# Patient Record
Sex: MALE | Race: WHITE | NOT HISPANIC OR LATINO
[De-identification: names, ages, dates, MRNs, and addresses within clinical notes are randomized per-mention and may not be internally consistent; named-entity substitution may affect disease eponyms.]

---

## 2022-10-13 PROBLEM — Z00.00 ENCOUNTER FOR PREVENTIVE HEALTH EXAMINATION: Status: ACTIVE | Noted: 2022-10-13

## 2022-10-14 ENCOUNTER — APPOINTMENT (OUTPATIENT)
Dept: ORTHOPEDIC SURGERY | Facility: CLINIC | Age: 87
End: 2022-10-14

## 2022-10-14 VITALS
SYSTOLIC BLOOD PRESSURE: 113 MMHG | HEIGHT: 69 IN | BODY MASS INDEX: 25.92 KG/M2 | WEIGHT: 175 LBS | DIASTOLIC BLOOD PRESSURE: 72 MMHG | HEART RATE: 78 BPM

## 2022-10-14 DIAGNOSIS — M48.04 SPINAL STENOSIS, THORACIC REGION: ICD-10-CM

## 2022-10-14 DIAGNOSIS — Z86.79 PERSONAL HISTORY OF OTHER DISEASES OF THE CIRCULATORY SYSTEM: ICD-10-CM

## 2022-10-14 DIAGNOSIS — Z78.9 OTHER SPECIFIED HEALTH STATUS: ICD-10-CM

## 2022-10-14 DIAGNOSIS — S32.10XA UNSPECIFIED FRACTURE OF SACRUM, INITIAL ENCOUNTER FOR CLOSED FRACTURE: ICD-10-CM

## 2022-10-14 PROCEDURE — 99203 OFFICE O/P NEW LOW 30 MIN: CPT

## 2022-10-14 NOTE — HISTORY OF PRESENT ILLNESS
[de-identified] : Mr. AG MORENO  is a 89 year old male who presents with low back and sacrum pain after a fall 2 weeks ago when he hit his back on the corner of a piece of furniture.  Denies any LE radicular symptoms.  Normal bowel and bladder control.   Denies any recent fevers, chills, sweats, weight loss, or infection.  He uses a pillow for sitting and it helps.  He uses Tylenol for symptom control with minimal relief. \par \par The patients past medical history, past surgical history, medications, allergies, and social history were reviewed by me today with the patient and documented accordingly.  In addition, the patient's family history, which is noncontributory to their visit, was also reviewed.\par

## 2022-10-14 NOTE — PHYSICAL EXAM
[Antalgic] : antalgic [de-identified] : Examination of the lumbar spine reveals no midline tenderness palpation, step-offs, or skin lesions. Decreased range of motion with respect to flexion, extension, lateral bending, and rotation. No tenderness to palpation of the sciatic notch. No tenderness palpation of the bilateral greater trochanters. No pain with passive internal/external rotation of the hips. No instability of bilateral lower extremities.  Negative HALIE. Negative straight leg raise bilaterally. No bowstring. Negative femoral stretch. 5 out of 5 iliopsoas, hip abductors, hips adductors, quadriceps, hamstrings, gastrocsoleus, tibialis anterior, extensor hallucis longus, peroneals. Grossly intact sensation to light touch bilateral lower extremities. 1+ patellar and Achilles reflexes. Downgoing Babinski. No clonus. Intact proprioception. Palpable pulses. No skin lesion and no edema on the right and left lower extremities. [de-identified] : AP lateral lumbar and pelvic x-rays does display a minimally displaced sacral fracture.  He also has an age-indeterminate T12 fracture.

## 2022-12-21 ENCOUNTER — APPOINTMENT (OUTPATIENT)
Dept: SURGICAL ONCOLOGY | Facility: CLINIC | Age: 87
End: 2022-12-21

## 2022-12-21 VITALS
DIASTOLIC BLOOD PRESSURE: 82 MMHG | OXYGEN SATURATION: 95 % | HEIGHT: 69 IN | WEIGHT: 170 LBS | SYSTOLIC BLOOD PRESSURE: 136 MMHG | RESPIRATION RATE: 16 BRPM | BODY MASS INDEX: 25.18 KG/M2 | HEART RATE: 84 BPM

## 2022-12-21 PROCEDURE — 99205 OFFICE O/P NEW HI 60 MIN: CPT

## 2022-12-21 NOTE — HISTORY OF PRESENT ILLNESS
[de-identified] : Patient is a 89 y/o male who presents an initial consultation for lesion in the mid-forehead present for several months which was biopsied and revealed squamous cell carcinoma. He was referred by Dr. Merrill and Dr. Salazar. \par \par Dermatopathology (8/03/22):\par Mid Forehead\par -Invasive poorly differentiated squamous cell carcinoma, extending to the base and lateral tissue edges\par \par PMHx: HTN, BPH, He is currently on Xarelto for PE back in 2020, Prior basal cell carcinomas resected

## 2022-12-21 NOTE — ASSESSMENT
[FreeTextEntry1] : Mid frontal scalp squamous cell carcinoma \par I had a long discussion with the pt and his family regarding his diagnosis, prognosis and all management options\par Will schedule for wide excision of the mid frontal scalp SCC w/ plastic reconstruction and excisional biopsies/resection of bilateral cheek lesions at the same time\par Surgical procedure discussed in detail\par All questions answered\par Will need medical clearance regarding stopping Xarelto prior to surgery   \par

## 2022-12-21 NOTE — CONSULT LETTER
[Consult Letter:] : I had the pleasure of evaluating your patient, [unfilled]. [Please see my note below.] : Please see my note below. [Sincerely,] : Sincerely, [Dear  ___] : Dear  [unfilled], [FreeTextEntry3] : Chaim Richardson MD FACS\par  [DrParviz  ___] : Dr. HORTON [DrParviz ___] : Dr. HORTON

## 2022-12-21 NOTE — ADDENDUM
[FreeTextEntry1] : I, Katheryn Velasquez, acted solely as a scribe for Dr. Chaim Richardson on this date 12/21/2022.\par

## 2022-12-21 NOTE — PHYSICAL EXAM
[Normal] : supple, no neck mass and thyroid not enlarged [Normal Neck Lymph Nodes] : normal neck lymph nodes  [Normal Supraclavicular Lymph Nodes] : normal supraclavicular lymph nodes [Normal Groin Lymph Nodes] : normal groin lymph nodes [Normal Axillary Lymph Nodes] : normal axillary lymph nodes [Normal] : oriented to person, place and time, with appropriate affect [de-identified] : 3 cm fungating tumor mid frontal scalp at site of SCC.  1.5 cm ulcerating mass right lateral cheek. 1 cm nodule left temporal region possibly SCC as well. Multiple benign-appearing pigmented and crusty lesions head, neck, trunk, extremities.

## 2022-12-28 ENCOUNTER — OUTPATIENT (OUTPATIENT)
Dept: OUTPATIENT SERVICES | Facility: HOSPITAL | Age: 87
LOS: 1 days | End: 2022-12-28
Payer: MEDICARE

## 2022-12-28 ENCOUNTER — RESULT REVIEW (OUTPATIENT)
Age: 87
End: 2022-12-28

## 2022-12-28 VITALS
WEIGHT: 169.32 LBS | TEMPERATURE: 98 F | HEIGHT: 67.75 IN | RESPIRATION RATE: 16 BRPM | DIASTOLIC BLOOD PRESSURE: 64 MMHG | SYSTOLIC BLOOD PRESSURE: 110 MMHG | HEART RATE: 78 BPM | OXYGEN SATURATION: 98 %

## 2022-12-28 DIAGNOSIS — C44.329 SQUAMOUS CELL CARCINOMA OF SKIN OF OTHER PARTS OF FACE: ICD-10-CM

## 2022-12-28 DIAGNOSIS — R22.0 LOCALIZED SWELLING, MASS AND LUMP, HEAD: ICD-10-CM

## 2022-12-28 DIAGNOSIS — I26.99 OTHER PULMONARY EMBOLISM WITHOUT ACUTE COR PULMONALE: ICD-10-CM

## 2022-12-28 DIAGNOSIS — Z01.818 ENCOUNTER FOR OTHER PREPROCEDURAL EXAMINATION: ICD-10-CM

## 2022-12-28 DIAGNOSIS — Z90.49 ACQUIRED ABSENCE OF OTHER SPECIFIED PARTS OF DIGESTIVE TRACT: Chronic | ICD-10-CM

## 2022-12-28 DIAGNOSIS — I10 ESSENTIAL (PRIMARY) HYPERTENSION: ICD-10-CM

## 2022-12-28 PROCEDURE — G0463: CPT

## 2022-12-28 PROCEDURE — 88321 CONSLTJ&REPRT SLD PREP ELSWR: CPT

## 2022-12-28 NOTE — H&P PST ADULT - NSICDXPASTMEDICALHX_GEN_ALL_CORE_FT
I will SWITCH the dose or number of times a day I take the medications listed below when I get home from the hospital:  None
PAST MEDICAL HISTORY:  History of BPH     HTN (hypertension)     Pulmonary embolism     Squamous cell carcinoma of skin of other parts of face

## 2022-12-28 NOTE — H&P PST ADULT - NSANTHOSAYNRD_GEN_A_CORE
No. DONY screening performed.  STOP BANG Legend: 0-2 = LOW Risk; 3-4 = INTERMEDIATE Risk; 5-8 = HIGH Risk

## 2022-12-28 NOTE — H&P PST ADULT - HISTORY OF PRESENT ILLNESS
This is a 89 y/o male with PMHX of HTN, BPH and PE (2020 on Xarelto, negative hematology workup), who presents to PST with pre-operative diagnosis of squamous cell carcinoma.  Pt had lesion on forehead  for a few months, which was biopsied by dermatology.  Pathology returned with squamous cell carcinoma.  Otherwise patient feels well today and denies any acute symptoms.

## 2023-01-03 ENCOUNTER — APPOINTMENT (OUTPATIENT)
Dept: SURGICAL ONCOLOGY | Facility: HOSPITAL | Age: 88
End: 2023-01-03

## 2023-01-03 ENCOUNTER — INPATIENT (INPATIENT)
Facility: HOSPITAL | Age: 88
LOS: 1 days | Discharge: HOME | DRG: 310 | End: 2023-01-05
Attending: HOSPITALIST | Admitting: HOSPITALIST
Payer: MEDICARE

## 2023-01-03 VITALS
DIASTOLIC BLOOD PRESSURE: 88 MMHG | HEIGHT: 67.75 IN | OXYGEN SATURATION: 97 % | RESPIRATION RATE: 20 BRPM | TEMPERATURE: 99 F | SYSTOLIC BLOOD PRESSURE: 143 MMHG | HEART RATE: 123 BPM | WEIGHT: 179.9 LBS

## 2023-01-03 DIAGNOSIS — Z90.49 ACQUIRED ABSENCE OF OTHER SPECIFIED PARTS OF DIGESTIVE TRACT: Chronic | ICD-10-CM

## 2023-01-03 DIAGNOSIS — R00.2 PALPITATIONS: ICD-10-CM

## 2023-01-03 LAB
ALBUMIN SERPL ELPH-MCNC: 3.6 G/DL — SIGNIFICANT CHANGE UP (ref 3.3–5)
ALP SERPL-CCNC: 67 U/L — SIGNIFICANT CHANGE UP (ref 40–120)
ALT FLD-CCNC: 16 U/L — SIGNIFICANT CHANGE UP (ref 10–45)
ANION GAP SERPL CALC-SCNC: 7 MMOL/L — SIGNIFICANT CHANGE UP (ref 5–17)
APTT BLD: 27.6 SEC — SIGNIFICANT CHANGE UP (ref 27.5–35.5)
AST SERPL-CCNC: 16 U/L — SIGNIFICANT CHANGE UP (ref 10–40)
BASOPHILS # BLD AUTO: 0.04 K/UL — SIGNIFICANT CHANGE UP (ref 0–0.2)
BASOPHILS NFR BLD AUTO: 0.6 % — SIGNIFICANT CHANGE UP (ref 0–2)
BILIRUB SERPL-MCNC: 0.6 MG/DL — SIGNIFICANT CHANGE UP (ref 0.2–1.2)
BUN SERPL-MCNC: 31 MG/DL — HIGH (ref 7–23)
CALCIUM SERPL-MCNC: 9.2 MG/DL — SIGNIFICANT CHANGE UP (ref 8.4–10.5)
CHLORIDE SERPL-SCNC: 105 MMOL/L — SIGNIFICANT CHANGE UP (ref 96–108)
CO2 SERPL-SCNC: 27 MMOL/L — SIGNIFICANT CHANGE UP (ref 22–31)
CREAT SERPL-MCNC: 1.23 MG/DL — SIGNIFICANT CHANGE UP (ref 0.5–1.3)
EGFR: 55 ML/MIN/1.73M2 — LOW
EOSINOPHIL # BLD AUTO: 0.07 K/UL — SIGNIFICANT CHANGE UP (ref 0–0.5)
EOSINOPHIL NFR BLD AUTO: 1.1 % — SIGNIFICANT CHANGE UP (ref 0–6)
GLUCOSE SERPL-MCNC: 103 MG/DL — HIGH (ref 70–99)
HCT VFR BLD CALC: 39.7 % — SIGNIFICANT CHANGE UP (ref 39–50)
HGB BLD-MCNC: 12.9 G/DL — LOW (ref 13–17)
IMM GRANULOCYTES NFR BLD AUTO: 0.3 % — SIGNIFICANT CHANGE UP (ref 0–0.9)
INR BLD: 1.14 RATIO — SIGNIFICANT CHANGE UP (ref 0.88–1.16)
LYMPHOCYTES # BLD AUTO: 1.1 K/UL — SIGNIFICANT CHANGE UP (ref 1–3.3)
LYMPHOCYTES # BLD AUTO: 16.8 % — SIGNIFICANT CHANGE UP (ref 13–44)
MAGNESIUM SERPL-MCNC: 2.2 MG/DL — SIGNIFICANT CHANGE UP (ref 1.6–2.6)
MCHC RBC-ENTMCNC: 28.4 PG — SIGNIFICANT CHANGE UP (ref 27–34)
MCHC RBC-ENTMCNC: 32.5 GM/DL — SIGNIFICANT CHANGE UP (ref 32–36)
MCV RBC AUTO: 87.3 FL — SIGNIFICANT CHANGE UP (ref 80–100)
MONOCYTES # BLD AUTO: 0.63 K/UL — SIGNIFICANT CHANGE UP (ref 0–0.9)
MONOCYTES NFR BLD AUTO: 9.6 % — SIGNIFICANT CHANGE UP (ref 2–14)
NEUTROPHILS # BLD AUTO: 4.68 K/UL — SIGNIFICANT CHANGE UP (ref 1.8–7.4)
NEUTROPHILS NFR BLD AUTO: 71.6 % — SIGNIFICANT CHANGE UP (ref 43–77)
NRBC # BLD: 0 /100 WBCS — SIGNIFICANT CHANGE UP (ref 0–0)
PLATELET # BLD AUTO: 205 K/UL — SIGNIFICANT CHANGE UP (ref 150–400)
POTASSIUM SERPL-MCNC: 4.5 MMOL/L — SIGNIFICANT CHANGE UP (ref 3.5–5.3)
POTASSIUM SERPL-SCNC: 4.5 MMOL/L — SIGNIFICANT CHANGE UP (ref 3.5–5.3)
PROT SERPL-MCNC: 7 G/DL — SIGNIFICANT CHANGE UP (ref 6–8.3)
PROTHROM AB SERPL-ACNC: 13.3 SEC — SIGNIFICANT CHANGE UP (ref 10.5–13.4)
RBC # BLD: 4.55 M/UL — SIGNIFICANT CHANGE UP (ref 4.2–5.8)
RBC # FLD: 13.2 % — SIGNIFICANT CHANGE UP (ref 10.3–14.5)
SARS-COV-2 RNA SPEC QL NAA+PROBE: SIGNIFICANT CHANGE UP
SODIUM SERPL-SCNC: 139 MMOL/L — SIGNIFICANT CHANGE UP (ref 135–145)
TROPONIN I, HIGH SENSITIVITY RESULT: 14.7 NG/L — SIGNIFICANT CHANGE UP
TROPONIN I, HIGH SENSITIVITY RESULT: 15.1 NG/L — SIGNIFICANT CHANGE UP
TROPONIN I, HIGH SENSITIVITY RESULT: 15.7 NG/L — SIGNIFICANT CHANGE UP
WBC # BLD: 6.54 K/UL — SIGNIFICANT CHANGE UP (ref 3.8–10.5)
WBC # FLD AUTO: 6.54 K/UL — SIGNIFICANT CHANGE UP (ref 3.8–10.5)

## 2023-01-03 PROCEDURE — 93010 ELECTROCARDIOGRAM REPORT: CPT | Mod: 76

## 2023-01-03 PROCEDURE — 99222 1ST HOSP IP/OBS MODERATE 55: CPT

## 2023-01-03 PROCEDURE — 99223 1ST HOSP IP/OBS HIGH 75: CPT | Mod: AI

## 2023-01-03 PROCEDURE — 71045 X-RAY EXAM CHEST 1 VIEW: CPT | Mod: 26

## 2023-01-03 PROCEDURE — 99285 EMERGENCY DEPT VISIT HI MDM: CPT | Mod: FS

## 2023-01-03 RX ORDER — ONDANSETRON 8 MG/1
4 TABLET, FILM COATED ORAL EVERY 8 HOURS
Refills: 0 | Status: DISCONTINUED | OUTPATIENT
Start: 2023-01-03 | End: 2023-01-05

## 2023-01-03 RX ORDER — HEPARIN SODIUM 5000 [USP'U]/ML
6500 INJECTION INTRAVENOUS; SUBCUTANEOUS ONCE
Refills: 0 | Status: COMPLETED | OUTPATIENT
Start: 2023-01-03 | End: 2023-01-03

## 2023-01-03 RX ORDER — TAMSULOSIN HYDROCHLORIDE 0.4 MG/1
0.4 CAPSULE ORAL AT BEDTIME
Refills: 0 | Status: DISCONTINUED | OUTPATIENT
Start: 2023-01-03 | End: 2023-01-05

## 2023-01-03 RX ORDER — ACETAMINOPHEN 500 MG
650 TABLET ORAL EVERY 6 HOURS
Refills: 0 | Status: DISCONTINUED | OUTPATIENT
Start: 2023-01-03 | End: 2023-01-05

## 2023-01-03 RX ORDER — HEPARIN SODIUM 5000 [USP'U]/ML
6500 INJECTION INTRAVENOUS; SUBCUTANEOUS EVERY 6 HOURS
Refills: 0 | Status: DISCONTINUED | OUTPATIENT
Start: 2023-01-03 | End: 2023-01-05

## 2023-01-03 RX ORDER — VALSARTAN 80 MG/1
160 TABLET ORAL DAILY
Refills: 0 | Status: DISCONTINUED | OUTPATIENT
Start: 2023-01-03 | End: 2023-01-03

## 2023-01-03 RX ORDER — HEPARIN SODIUM 5000 [USP'U]/ML
INJECTION INTRAVENOUS; SUBCUTANEOUS
Qty: 25000 | Refills: 0 | Status: DISCONTINUED | OUTPATIENT
Start: 2023-01-03 | End: 2023-01-05

## 2023-01-03 RX ORDER — HEPARIN SODIUM 5000 [USP'U]/ML
3000 INJECTION INTRAVENOUS; SUBCUTANEOUS EVERY 6 HOURS
Refills: 0 | Status: DISCONTINUED | OUTPATIENT
Start: 2023-01-03 | End: 2023-01-05

## 2023-01-03 RX ORDER — METOPROLOL TARTRATE 50 MG
25 TABLET ORAL
Refills: 0 | Status: DISCONTINUED | OUTPATIENT
Start: 2023-01-03 | End: 2023-01-03

## 2023-01-03 RX ORDER — VALSARTAN 80 MG/1
1 TABLET ORAL
Qty: 0 | Refills: 0 | DISCHARGE

## 2023-01-03 RX ORDER — SODIUM CHLORIDE 9 MG/ML
1000 INJECTION INTRAMUSCULAR; INTRAVENOUS; SUBCUTANEOUS
Refills: 0 | Status: DISCONTINUED | OUTPATIENT
Start: 2023-01-03 | End: 2023-01-05

## 2023-01-03 RX ORDER — LANOLIN ALCOHOL/MO/W.PET/CERES
3 CREAM (GRAM) TOPICAL AT BEDTIME
Refills: 0 | Status: DISCONTINUED | OUTPATIENT
Start: 2023-01-03 | End: 2023-01-05

## 2023-01-03 RX ORDER — METOPROLOL TARTRATE 50 MG
25 TABLET ORAL ONCE
Refills: 0 | Status: COMPLETED | OUTPATIENT
Start: 2023-01-03 | End: 2023-01-03

## 2023-01-03 RX ORDER — METOPROLOL TARTRATE 50 MG
50 TABLET ORAL
Refills: 0 | Status: DISCONTINUED | OUTPATIENT
Start: 2023-01-04 | End: 2023-01-05

## 2023-01-03 RX ORDER — METOPROLOL TARTRATE 50 MG
12.5 TABLET ORAL ONCE
Refills: 0 | Status: COMPLETED | OUTPATIENT
Start: 2023-01-03 | End: 2023-01-03

## 2023-01-03 RX ORDER — AMLODIPINE BESYLATE 2.5 MG/1
5 TABLET ORAL DAILY
Refills: 0 | Status: DISCONTINUED | OUTPATIENT
Start: 2023-01-03 | End: 2023-01-05

## 2023-01-03 RX ADMIN — TAMSULOSIN HYDROCHLORIDE 0.4 MILLIGRAM(S): 0.4 CAPSULE ORAL at 21:56

## 2023-01-03 RX ADMIN — HEPARIN SODIUM 5000 UNIT(S): 5000 INJECTION INTRAVENOUS; SUBCUTANEOUS at 18:56

## 2023-01-03 RX ADMIN — Medication 25 MILLIGRAM(S): at 19:22

## 2023-01-03 RX ADMIN — SODIUM CHLORIDE 100 MILLILITER(S): 9 INJECTION INTRAMUSCULAR; INTRAVENOUS; SUBCUTANEOUS at 22:19

## 2023-01-03 RX ADMIN — HEPARIN SODIUM 1500 UNIT(S)/HR: 5000 INJECTION INTRAVENOUS; SUBCUTANEOUS at 18:57

## 2023-01-03 RX ADMIN — Medication 12.5 MILLIGRAM(S): at 10:47

## 2023-01-03 RX ADMIN — HEPARIN SODIUM 1500 UNIT(S)/HR: 5000 INJECTION INTRAVENOUS; SUBCUTANEOUS at 20:01

## 2023-01-03 RX ADMIN — Medication 25 MILLIGRAM(S): at 18:28

## 2023-01-03 NOTE — ED ADULT NURSE NOTE - HISTORY OF COVID-19 VACCINATION
Fever beginning yesterday, Dad took pt to the ER, dad states he was advised to give Ibuprofen or Tylenol for fever.    Discussed possibility of increased Covid sx if pt has Covid and is taking Ibuprofen.  Afebrile this morning.   Da to give Tylenol for fever if fever recurs, to call if other sx develop.   
Kamlesh (patient's dad) states patient was seen in the ER yesterday for a fever.  They recommended ibuprofen and tylenol.  Kamlesh states PCP recommended patient to stay away from ibuprofen.  Should they still keep patient off ibuprofen?  Should patient come in to be seen? Please advise.    Kamlesh can be reached at [phone].  
Yes

## 2023-01-03 NOTE — ED ADULT NURSE NOTE - OBJECTIVE STATEMENT
89 yr old male to ED for complaints of palpitations. Patient BIB stretcher from pre- op for complaints of tachycardia and palpitations. Patient awake, alert, denies chest pain, shortness of breath.

## 2023-01-03 NOTE — H&P ADULT - NSHPSOCIALHISTORY_GEN_ALL_CORE
Prior cigar user, never cigarettes    Family hx significant for cancer; one sibling  in 70s of valvular heart disease

## 2023-01-03 NOTE — H&P ADULT - ASSESSMENT
89M with HTN, BPH, hx PE on Xarelto, comes to the ED for rapid heart rate while in pre-op awaiting for elective removal of SCC of skin with plastic reconstruction, found to have new AF with RVR    #AF with RVR  -Start lopressor 25 bid  -Already on Xarelto for PE.... but since patient is pre-op at this time, will start heparin drip (to cover AF as well as PE)  -Check echo  -Check TSH  -EKG in AM  -tele monitor  -Appreciate cardio recs: Dr. Miranda    #BPH  -c/w Flomax    #Essential HTN  -c/w Norvasc, Valsartan  -added Lopressor as above... may need to cut down on the Norvasc or Valsartan with the added beta blocker... will monitor BP    #DVT ppx: Heparin drip for now 89M with HTN, BPH, hx PE on Xarelto, comes to the ED for rapid heart rate while in pre-op awaiting for elective removal of SCC of skin with plastic reconstruction, found to have new AF with RVR    #AF with RVR  -Start lopressor 50 bid as patient still with fast HR on 25 mg   -Already on Xarelto for PE.... but since patient is pre-op at this time, will start heparin drip (to cover AF as well as PE)  -Check echo  -Check TSH  -EKG in AM  -tele monitor  -Appreciate cardio recs: Dr. Miranda  -Spoke to Dr. Richardson, surgery, who is hoping to do the procedure on Thursday... will attempt to optimize     #BPH  -c/w Flomax    #Essential HTN  -c/w Norvasc  -d/c Valsartan while adding Lopressor   -added Lopressor as above    #DVT ppx: Heparin drip for now    GOC: FULL CODE 89M with HTN, BPH, hx PE on Xarelto, comes to the ED for rapid heart rate while in pre-op awaiting for elective removal of SCC of skin with plastic reconstruction, found to have new AF with RVR    #AF with RVR  -Start lopressor 50 bid as patient still with fast HR on 25 mg   -Already on Xarelto for PE.... but since patient is pre-op at this time, will start heparin drip (to cover AF as well as PE)  -Check echo  -Check TSH  -EKG in AM  -500 cc IVF as patient dehydration and this could be precipitating the fast HR  -tele monitor  -Appreciate cardio recs: Dr. Miranda  -Spoke to Dr. Richardson, surgery, who is hoping to do the procedure on Thursday... will attempt to optimize     #BPH  -c/w Flomax    #Essential HTN  -c/w Norvasc  -d/c Valsartan while adding Lopressor   -added Lopressor as above    #DVT ppx: Heparin drip for now    GOC: FULL CODE

## 2023-01-03 NOTE — ED ADULT NURSE REASSESSMENT NOTE - NS ED NURSE REASSESS COMMENT FT1
Patient denies pain/complaints. Awaiting bed, attempted to give report, nurse Gilda to call back. Spoke to Debra. Continuous monitoring. Safety maintained.

## 2023-01-03 NOTE — CONSULT NOTE ADULT - SUBJECTIVE AND OBJECTIVE BOX
AG MORENO  [id number]      HPI:    Ag Moreno is a 89 year old man with past medical history of Pulmonary embolism (2020, on Xarelto), Hypertension and BPH who presented today for elective wide excision of frontal scalp squamous cell carcinoma with plastic reconstruction.     ALLERGIES:  No Known Allergies      PAST MEDICAL & SURGICAL HISTORY:  Squamous cell carcinoma of skin of other parts of face  History of BPH  HTN (hypertension)  Pulmonary embolism  S/P colon resection        ROS:  All 10 systems reviewed and positives noted in HPI    OBJECTIVE:    VITAL SIGNS:  Vital Signs Last 24 Hrs  T(C): 37 (03 Jan 2023 09:43), Max: 37 (03 Jan 2023 09:43)  T(F): 98.6 (03 Jan 2023 09:43), Max: 98.6 (03 Jan 2023 09:43)  HR: 124 (03 Jan 2023 11:54) (122 - 129)  BP: 122/105 (03 Jan 2023 11:54) (122/105 - 150/77)  BP(mean): --  RR: 23 (03 Jan 2023 10:45) (16 - 23)  SpO2: 97% (03 Jan 2023 11:54) (91% - 97%)    Parameters below as of 03 Jan 2023 09:43  Patient On (Oxygen Delivery Method): room air        PHYSICAL EXAM:  General: well appearing, no distress  HEENT: sclera anicteric  Neck: supple, no carotid bruits b/l  CVS: JVP ~ 7 cm H20, RRR, s1, s2, no murmurs/rubs/gallops  Chest: unlabored respirations, clear to auscultation b/l  Abdomen: non-distended  Extremities: no lower extremity edema b/l  Neuro: awake, alert & oriented x 3  Psych: normal affect      LABS:                        12.9   6.54  )-----------( 205      ( 03 Jan 2023 10:25 )             39.7     01-03    139  |  105  |  31<H>  ----------------------------<  103<H>  4.5   |  27  |  1.23    Ca    9.2      03 Jan 2023 10:25  Mg     2.2     01-03    TPro  7.0  /  Alb  3.6  /  TBili  0.6  /  DBili  x   /  AST  16  /  ALT  16  /  AlkPhos  67  01-03          No prior cardiac workup in chart   AG MORENO  [id number]      HPI:    Ag Moreno is a 89 year old man with past medical history of Pulmonary embolism (2020, on Xarelto), Hypertension and BPH who presented today for elective wide excision of frontal scalp squamous cell carcinoma with plastic reconstruction, sent to ER due to tachycardia.    The patient denies prior cardiac history. Denies chest pain, palpitations or shortness of breath. Denies prior history of atrial fibrillation.     ALLERGIES:  No Known Allergies      PAST MEDICAL & SURGICAL HISTORY:  Squamous cell carcinoma of skin of other parts of face  History of BPH  HTN (hypertension)  Pulmonary embolism  S/P colon resection        ROS:  All 10 systems reviewed and positives noted in HPI    OBJECTIVE:    VITAL SIGNS:  Vital Signs Last 24 Hrs  T(C): 37 (03 Jan 2023 09:43), Max: 37 (03 Jan 2023 09:43)  T(F): 98.6 (03 Jan 2023 09:43), Max: 98.6 (03 Jan 2023 09:43)  HR: 124 (03 Jan 2023 11:54) (122 - 129)  BP: 122/105 (03 Jan 2023 11:54) (122/105 - 150/77)  BP(mean): --  RR: 23 (03 Jan 2023 10:45) (16 - 23)  SpO2: 97% (03 Jan 2023 11:54) (91% - 97%)    Parameters below as of 03 Jan 2023 09:43  Patient On (Oxygen Delivery Method): room air        PHYSICAL EXAM:  General: no acute distress  HEENT: sclera anicteric  Neck: supple, no carotid bruits b/l  CVS: JVP ~ 7 cm H20, irregular, tachycardic, s1, s2  Chest: unlabored respirations, clear to auscultation b/l  Abdomen: non-distended  Extremities: no lower extremity edema b/l  Neuro: awake, alert & oriented x 3  Psych: normal affect      LABS:                        12.9   6.54  )-----------( 205      ( 03 Jan 2023 10:25 )             39.7     01-03    139  |  105  |  31<H>  ----------------------------<  103<H>  4.5   |  27  |  1.23    Ca    9.2      03 Jan 2023 10:25  Mg     2.2     01-03    TPro  7.0  /  Alb  3.6  /  TBili  0.6  /  DBili  x   /  AST  16  /  ALT  16  /  AlkPhos  67  01-03    Initial ECG: poor baseline, possible sinus rhythm   Repeat ECG (1/3/23): atrial flutter with rapid ventricular response    No prior cardiac workup in chart

## 2023-01-03 NOTE — ED ADULT NURSE REASSESSMENT NOTE - NS ED NURSE REASSESS COMMENT FT1
Patient's heart rate 126-133, denies chest pain/complaints. Dr. Hensley aware of HR. Patient medicated with metoprolol 25 mg po. Continuous monitoring. Safety maintained.

## 2023-01-03 NOTE — CONSULT NOTE ADULT - ASSESSMENT
Assessment:  Rick Chan is a 89 year old man with past medical history of Pulmonary embolism (2020, on Xarelto), Hypertension and BPH who presented today for elective wide excision of frontal scalp squamous cell carcinoma with plastic reconstruction.  Assessment:  Rick Chan is a 89 year old man with past medical history of Pulmonary embolism (2020, on Xarelto), Hypertension and BPH who presented today for elective wide excision of frontal scalp squamous cell carcinoma with plastic reconstruction, sent to ER due to tachycardia, found to have new onset atrial flutter.    ECG consistent with atrial flutter with rapid ventricular rates. Troponins negative x 3. The patient is asymptomatic, denies palpitations, angina or dyspnea. No signs of CHF.     Recommendations:  [] New onset atrial flutter: Due to elevated CHADSVASc score, anticoagulation for stroke prophylaxis would be recommended however patient awaiting re-scheduling of scalp surgery this week, will need to resume home Xarelto once safe per Surgical team (patient takes this for PE history). Consider Hematology evaluation while Xarelto being held. Dose Metoprolol tartrate 25 mg PO BID. Continue to monitor on telemetry. Check echocardiogram to evaluate for structural heart disease. Check TSH.    Recommend inpatient admission, discussed with ER team.    We will continue to follow along.    Derek Miranda MD  Cardiology

## 2023-01-03 NOTE — ED ADULT NURSE NOTE - NSICDXPASTMEDICALHX_GEN_ALL_CORE_FT
PAST MEDICAL HISTORY:  History of BPH     HTN (hypertension)     Pulmonary embolism     Squamous cell carcinoma of skin of other parts of face

## 2023-01-03 NOTE — ED PROVIDER NOTE - CLINICAL SUMMARY MEDICAL DECISION MAKING FREE TEXT BOX
89 yo male, hx bph , htn comes to the ED for rapid heart rate. As per patient was upstairs in the hospital getting ready to have a squamous cell removed and his heart rate was elevated on the monitor to the 130s so they brought him down to the ED. As per patient he feels fine.  Denies any c pain, headaches, dizziness, shortness of breath, lightheadedness or any other complaints. 89 yo male, hx bph , htn comes to the ED for rapid heart rate. As per patient was upstairs in the hospital getting ready to have a squamous cell removed and his heart rate was elevated on the monitor to the 130s so they brought him down to the ED. As per patient he feels fine.  Denies any c pain, headaches, dizziness, shortness of breath, lightheadedness or any other complaints.    Seen by Dr Miranda cardiology,  pt new onset a fib. can put on metoprolol tartrate 25 BID. At this time. To  admit for work up and will have his Squamous cell removed Thursday likely so no anticoagulation at this time as per cardiology.

## 2023-01-03 NOTE — H&P ADULT - NSHPPHYSICALEXAM_GEN_ALL_CORE
Vital Signs Last 24 Hrs  T(F): 98.6 (03 Jan 2023 09:43), Max: 98.6 (03 Jan 2023 09:43)  HR: 126 (03 Jan 2023 18:00) (122 - 129)  BP: 134/86 (03 Jan 2023 18:00) (122/105 - 150/77)  RR: 19 (03 Jan 2023 18:00) (16 - 23)  SpO2: 98% (03 Jan 2023 18:00) (91% - 98%)    PHYSICAL EXAM:  GENERAL: NAD  HEAD:  Atraumatic, Normocephalic  EYES: EOMI, conjunctiva and sclera clear  ENMT: No gross hearing impairment, dry mucous membranes, Good dentition, no thrush  NECK: Supple, No JVD  CHEST/LUNG: Clear to auscultation bilaterally, good air entry, non-labored breathing  HEART: IRRR; tachycardic; S1/S2, No murmur  ABDOMEN: Soft, Nontender, Nondistended; Bowel sounds present  VASCULAR: Normal pulses, Normal capillary refill  EXTREMITIES: No calf tenderness, No cyanosis, 1+ pitting edema  LYMPH: Normal; No lymphadenopathy noted  SKIN: Warm, Intact, + tenting; forehead rounded lesion malodorous with scant discharge  PSYCH: Normal mood, Normal affect  NERVOUS SYSTEM:  A/O x3, Good concentration; CN 2-12 intact, No focal deficits

## 2023-01-03 NOTE — ED PROVIDER NOTE - OBJECTIVE STATEMENT
89 yo male, hx bph , htn comes to the ED for rapid heart rate. As per patient was upstairs in the hospital getting ready to have a squamous cell removed and his heart rate was elevated on the monitor to the 130s so they brought him down to the ED. As per patient he feels fine.  Denies any c pain, headaches, dizziness, shortness of breath, lightheadedness or any other complaints.

## 2023-01-03 NOTE — PATIENT PROFILE ADULT - FALL HARM RISK - HARM RISK INTERVENTIONS

## 2023-01-03 NOTE — PATIENT PROFILE ADULT - STATED REASON FOR ADMISSION
"I have a squamous cell lesion that had to be removed,  but my heart beat was too high so they didn't do the surgery"

## 2023-01-03 NOTE — ED PROVIDER NOTE - ATTENDING APP SHARED VISIT CONTRIBUTION OF CARE
Pt in NAD;  HEENT: NCAT; AAO x 3; lungs CTAB; heart: tachycardic/irregular; Abdomen SNT; non-focal neuro exam    Angelo Patricio DO

## 2023-01-03 NOTE — H&P ADULT - HISTORY OF PRESENT ILLNESS
89M with HTN, BPH, hx PE on Xarelto, comes to the ED for rapid heart rate while in pre-op awaiting for elective removal of SCC from forehead with plan for plastic reconstruction.  89M with HTN, BPH, hx PE on Xarelto, comes to the ED for rapid heart rate while in pre-op awaiting for elective removal of SCC from forehead with plan for plastic reconstruction. Patient is ASYMPTOMATIC. No chest pain. No shortness of breath. No palpitations. No SHEFFIELD. No hx of A-fib. Unaware that patient is currently have a rapid HR. Does have chronic HTN (stable, controlled with meds), BPH (stable, controlled with med, voids freely), and known history of PE (stable, no SOB, on Xarelto). Compliant with all meds. Patient does have forehead lesion (SCC) and other facial skin lesions. Last ate/drank last night.

## 2023-01-03 NOTE — H&P ADULT - NSHPLABSRESULTS_GEN_ALL_CORE
.                            12.9   6.54  )-----------( 205      ( 03 Jan 2023 10:25 )             39.7       01-03    139  |  105  |  31  ----------------------------<  103  4.5   |  27  |  1.23    Ca    9.2      03 Jan 2023 10:25  Mg     2.2     01-03    TPro  7.0  /  Alb  3.6  /  TBili  0.6  /  DBili  x   /  AST  16  /  ALT  16  /  AlkPhos  67  01-03    PT/INR - ( 03 Jan 2023 18:25 )   PT: 13.3 sec;   INR: 1.14 ratio         PTT - ( 03 Jan 2023 18:25 )  PTT:27.6 sec  CARDIAC MARKERS ( 03 Jan 2023 12:50 )  x     / 15.7 ng/L / x     / x     / x      CARDIAC MARKERS ( 03 Jan 2023 11:20 )  x     / 14.7 ng/L / x     / x     / x      CARDIAC MARKERS ( 03 Jan 2023 10:25 )  x     / 15.1 ng/L / x     / x     / x        COVID-19 PCR: NotDetec (01-03-23 @ 15:05)    EKG: AF with RVR    Case d/w Dr. Richardson - oncologic surgeon, re: surgical planning

## 2023-01-04 PROBLEM — Z87.438 PERSONAL HISTORY OF OTHER DISEASES OF MALE GENITAL ORGANS: Chronic | Status: ACTIVE | Noted: 2022-12-28

## 2023-01-04 PROBLEM — I10 ESSENTIAL (PRIMARY) HYPERTENSION: Chronic | Status: ACTIVE | Noted: 2022-12-28

## 2023-01-04 PROBLEM — I26.99 OTHER PULMONARY EMBOLISM WITHOUT ACUTE COR PULMONALE: Chronic | Status: ACTIVE | Noted: 2022-12-28

## 2023-01-04 PROBLEM — C44.329 SQUAMOUS CELL CARCINOMA OF SKIN OF OTHER PARTS OF FACE: Chronic | Status: ACTIVE | Noted: 2022-12-28

## 2023-01-04 LAB
ANION GAP SERPL CALC-SCNC: 5 MMOL/L — SIGNIFICANT CHANGE UP (ref 5–17)
APTT BLD: 111 SEC — HIGH (ref 27.5–35.5)
APTT BLD: 170.3 SEC — SIGNIFICANT CHANGE UP (ref 27.5–35.5)
APTT BLD: 70.7 SEC — HIGH (ref 27.5–35.5)
APTT BLD: 97 SEC — HIGH (ref 27.5–35.5)
BUN SERPL-MCNC: 27 MG/DL — HIGH (ref 7–23)
CALCIUM SERPL-MCNC: 8.4 MG/DL — SIGNIFICANT CHANGE UP (ref 8.4–10.5)
CHLORIDE SERPL-SCNC: 104 MMOL/L — SIGNIFICANT CHANGE UP (ref 96–108)
CO2 SERPL-SCNC: 27 MMOL/L — SIGNIFICANT CHANGE UP (ref 22–31)
CREAT SERPL-MCNC: 1.12 MG/DL — SIGNIFICANT CHANGE UP (ref 0.5–1.3)
EGFR: 62 ML/MIN/1.73M2 — SIGNIFICANT CHANGE UP
GLUCOSE SERPL-MCNC: 95 MG/DL — SIGNIFICANT CHANGE UP (ref 70–99)
HCT VFR BLD CALC: 36.7 % — LOW (ref 39–50)
HCT VFR BLD CALC: 37.7 % — LOW (ref 39–50)
HGB BLD-MCNC: 11.8 G/DL — LOW (ref 13–17)
HGB BLD-MCNC: 12 G/DL — LOW (ref 13–17)
MAGNESIUM SERPL-MCNC: 2.2 MG/DL — SIGNIFICANT CHANGE UP (ref 1.6–2.6)
MCHC RBC-ENTMCNC: 28 PG — SIGNIFICANT CHANGE UP (ref 27–34)
MCHC RBC-ENTMCNC: 28 PG — SIGNIFICANT CHANGE UP (ref 27–34)
MCHC RBC-ENTMCNC: 31.8 GM/DL — LOW (ref 32–36)
MCHC RBC-ENTMCNC: 32.2 GM/DL — SIGNIFICANT CHANGE UP (ref 32–36)
MCV RBC AUTO: 87 FL — SIGNIFICANT CHANGE UP (ref 80–100)
MCV RBC AUTO: 87.9 FL — SIGNIFICANT CHANGE UP (ref 80–100)
NRBC # BLD: 0 /100 WBCS — SIGNIFICANT CHANGE UP (ref 0–0)
NRBC # BLD: 0 /100 WBCS — SIGNIFICANT CHANGE UP (ref 0–0)
PHOSPHATE SERPL-MCNC: 2.7 MG/DL — SIGNIFICANT CHANGE UP (ref 2.5–4.5)
PLATELET # BLD AUTO: 199 K/UL — SIGNIFICANT CHANGE UP (ref 150–400)
PLATELET # BLD AUTO: 203 K/UL — SIGNIFICANT CHANGE UP (ref 150–400)
POTASSIUM SERPL-MCNC: 4.4 MMOL/L — SIGNIFICANT CHANGE UP (ref 3.5–5.3)
POTASSIUM SERPL-SCNC: 4.4 MMOL/L — SIGNIFICANT CHANGE UP (ref 3.5–5.3)
RBC # BLD: 4.22 M/UL — SIGNIFICANT CHANGE UP (ref 4.2–5.8)
RBC # BLD: 4.29 M/UL — SIGNIFICANT CHANGE UP (ref 4.2–5.8)
RBC # FLD: 13.2 % — SIGNIFICANT CHANGE UP (ref 10.3–14.5)
RBC # FLD: 13.2 % — SIGNIFICANT CHANGE UP (ref 10.3–14.5)
SODIUM SERPL-SCNC: 136 MMOL/L — SIGNIFICANT CHANGE UP (ref 135–145)
TSH SERPL-MCNC: 0.69 UIU/ML — SIGNIFICANT CHANGE UP (ref 0.36–3.74)
WBC # BLD: 5.95 K/UL — SIGNIFICANT CHANGE UP (ref 3.8–10.5)
WBC # BLD: 6.14 K/UL — SIGNIFICANT CHANGE UP (ref 3.8–10.5)
WBC # FLD AUTO: 5.95 K/UL — SIGNIFICANT CHANGE UP (ref 3.8–10.5)
WBC # FLD AUTO: 6.14 K/UL — SIGNIFICANT CHANGE UP (ref 3.8–10.5)

## 2023-01-04 PROCEDURE — 93010 ELECTROCARDIOGRAM REPORT: CPT

## 2023-01-04 PROCEDURE — 99233 SBSQ HOSP IP/OBS HIGH 50: CPT

## 2023-01-04 PROCEDURE — 93306 TTE W/DOPPLER COMPLETE: CPT | Mod: 26

## 2023-01-04 PROCEDURE — 99232 SBSQ HOSP IP/OBS MODERATE 35: CPT

## 2023-01-04 RX ADMIN — HEPARIN SODIUM 0 UNIT(S)/HR: 5000 INJECTION INTRAVENOUS; SUBCUTANEOUS at 01:43

## 2023-01-04 RX ADMIN — HEPARIN SODIUM 1200 UNIT(S)/HR: 5000 INJECTION INTRAVENOUS; SUBCUTANEOUS at 13:20

## 2023-01-04 RX ADMIN — HEPARIN SODIUM 1200 UNIT(S)/HR: 5000 INJECTION INTRAVENOUS; SUBCUTANEOUS at 07:23

## 2023-01-04 RX ADMIN — HEPARIN SODIUM 1000 UNIT(S)/HR: 5000 INJECTION INTRAVENOUS; SUBCUTANEOUS at 19:30

## 2023-01-04 RX ADMIN — TAMSULOSIN HYDROCHLORIDE 0.4 MILLIGRAM(S): 0.4 CAPSULE ORAL at 22:06

## 2023-01-04 RX ADMIN — AMLODIPINE BESYLATE 5 MILLIGRAM(S): 2.5 TABLET ORAL at 05:00

## 2023-01-04 RX ADMIN — HEPARIN SODIUM 1000 UNIT(S)/HR: 5000 INJECTION INTRAVENOUS; SUBCUTANEOUS at 17:25

## 2023-01-04 RX ADMIN — HEPARIN SODIUM 1200 UNIT(S)/HR: 5000 INJECTION INTRAVENOUS; SUBCUTANEOUS at 02:45

## 2023-01-04 RX ADMIN — HEPARIN SODIUM 1000 UNIT(S)/HR: 5000 INJECTION INTRAVENOUS; SUBCUTANEOUS at 23:48

## 2023-01-04 RX ADMIN — HEPARIN SODIUM 1200 UNIT(S)/HR: 5000 INJECTION INTRAVENOUS; SUBCUTANEOUS at 10:32

## 2023-01-04 NOTE — PROGRESS NOTE ADULT - ASSESSMENT
Assessment:  Rick Chan is a 89 year old man with past medical history of Pulmonary embolism (2020, on Xarelto), Hypertension and BPH who presented today for elective wide excision of frontal scalp squamous cell carcinoma with plastic reconstruction, sent to ER due to tachycardia, found to have atrial flutter with rapid ventricular rates.    ECG consistent with atrial flutter with rapid ventricular rates. Troponins negative x 3. TSH normal. The patient is asymptomatic, denies palpitations, angina or dyspnea. No signs of CHF.     Recommendations:  [] Atrial flutter/fibrillation: Due to elevated CHADSVASc score, anticoagulation for stroke prophylaxis would be recommended however patient awaiting re-scheduling of scalp surgery this week, will need to resume home Xarelto once safe per Surgical team (patient takes this for PE history). The patient is at moderate cardiovascular risk prior to scalp surgery. Consider Hematology evaluation while Xarelto being held. Patient now reporting a history of atrial fibrillation/flutter and reports that this is intermittent. Dose Metoprolol tartrate 25 mg PO BID. Continue to monitor on telemetry. Follow up echocardiogram to evaluate for structural heart disease.    Will sign out to cardiologist to follow along tomorrow.     Derek Miranda MD  Cardiology    Assessment:  Rick Chan is a 89 year old man with past medical history of Pulmonary embolism (2020, on Xarelto), Hypertension and BPH who presented today for elective wide excision of frontal scalp squamous cell carcinoma with plastic reconstruction, sent to ER due to tachycardia, found to have atrial flutter with rapid ventricular rates.    ECG consistent with atrial flutter with rapid ventricular rates. Troponins negative x 3. TSH normal. The patient is asymptomatic, denies palpitations, angina or dyspnea. No signs of CHF.     Recommendations:  [] Atrial flutter/fibrillation: Due to elevated CHADSVASc score, anticoagulation for stroke prophylaxis would be recommended however patient awaiting re-scheduling of scalp surgery this week, will need to resume home Xarelto once safe per Surgical team (patient takes this for PE history). Currently receiving heparin drip. The patient is at moderate cardiovascular risk prior to scalp surgery. Patient now reporting a history of atrial fibrillation/flutter and reports that this is intermittent. Dose Metoprolol tartrate 25 mg PO BID. Continue to monitor on telemetry. Follow up echocardiogram to evaluate for structural heart disease.    Will sign out to cardiologist to follow along tomorrow.     Derek Miranda MD  Cardiology    Assessment:  Rick Chan is a 89 year old man with past medical history of Pulmonary embolism (2020, on Xarelto), Hypertension and BPH who presented today for elective wide excision of frontal scalp squamous cell carcinoma with plastic reconstruction, sent to ER due to tachycardia, found to have atrial flutter with rapid ventricular rates.    ECG consistent with atrial flutter with rapid ventricular rates. Troponins negative x 3. TSH normal. The patient is asymptomatic, denies palpitations, angina or dyspnea. No signs of CHF.     Recommendations:  [] Atrial flutter/fibrillation: Due to elevated CHADSVASc score, anticoagulation for stroke prophylaxis would be recommended however patient awaiting re-scheduling of scalp surgery this week, will need to resume home Xarelto once safe per Surgical team (patient takes this for PE history). Currently receiving heparin drip. The patient is at moderate cardiovascular risk prior to scalp surgery. Patient now reporting a history of atrial fibrillation/flutter and reports that this is intermittent. Dose Metoprolol tartrate 25 mg PO BID. Continue to monitor on telemetry. Follow up echocardiogram to evaluate for structural heart disease.    Addendum:  TTE consistent with normal LV systolic function.     Will sign out to cardiologist to follow along tomorrow.     Derek Miranda MD  Cardiology

## 2023-01-04 NOTE — PROGRESS NOTE ADULT - ASSESSMENT
89M with HTN, BPH, hx PE on Xarelto, comes to the ED for rapid heart rate while in pre-op awaiting for elective removal of SCC of skin with plastic reconstruction, found to have new AF with RVR    #AF with RVR  -Now converted to NSR and improved HR  -c/w lopressor 50 bid (spoke to cardio, okay with this dose at this time)  -since patient is pre-op at this time, c/w heparin drip (to cover AF as well as PE) .... resume Xarelto post-surgery  -tele monitor  -Appreciate cardio recs: Dr. Miranda  -Spoke to Dr. Richardson, surgery, plan is for surgery tomorrow   -Optimized at this time    #BPH  -c/w Flomax    #Essential HTN  -c/w Norvasc  -off home Valsartan while on Lopressor     #DVT ppx: Heparin drip for now ..... should be stopped 4-6 hours prior to surgery.... will try to see when patient will be scheduled to coordinate a stop time....    GOC: FULL CODE

## 2023-01-05 ENCOUNTER — TRANSCRIPTION ENCOUNTER (OUTPATIENT)
Age: 88
End: 2023-01-05

## 2023-01-05 VITALS
DIASTOLIC BLOOD PRESSURE: 64 MMHG | OXYGEN SATURATION: 95 % | HEART RATE: 51 BPM | RESPIRATION RATE: 16 BRPM | SYSTOLIC BLOOD PRESSURE: 111 MMHG | TEMPERATURE: 98 F

## 2023-01-05 LAB
ANION GAP SERPL CALC-SCNC: 8 MMOL/L — SIGNIFICANT CHANGE UP (ref 5–17)
APTT BLD: 62.8 SEC — HIGH (ref 27.5–35.5)
BUN SERPL-MCNC: 28 MG/DL — HIGH (ref 7–23)
CALCIUM SERPL-MCNC: 8.5 MG/DL — SIGNIFICANT CHANGE UP (ref 8.4–10.5)
CHLORIDE SERPL-SCNC: 105 MMOL/L — SIGNIFICANT CHANGE UP (ref 96–108)
CO2 SERPL-SCNC: 24 MMOL/L — SIGNIFICANT CHANGE UP (ref 22–31)
CREAT SERPL-MCNC: 1.28 MG/DL — SIGNIFICANT CHANGE UP (ref 0.5–1.3)
EGFR: 53 ML/MIN/1.73M2 — LOW
GLUCOSE SERPL-MCNC: 106 MG/DL — HIGH (ref 70–99)
HCT VFR BLD CALC: 36.2 % — LOW (ref 39–50)
HGB BLD-MCNC: 11.6 G/DL — LOW (ref 13–17)
MCHC RBC-ENTMCNC: 28.2 PG — SIGNIFICANT CHANGE UP (ref 27–34)
MCHC RBC-ENTMCNC: 32 GM/DL — SIGNIFICANT CHANGE UP (ref 32–36)
MCV RBC AUTO: 88.1 FL — SIGNIFICANT CHANGE UP (ref 80–100)
NRBC # BLD: 0 /100 WBCS — SIGNIFICANT CHANGE UP (ref 0–0)
PLATELET # BLD AUTO: 166 K/UL — SIGNIFICANT CHANGE UP (ref 150–400)
POTASSIUM SERPL-MCNC: 3.9 MMOL/L — SIGNIFICANT CHANGE UP (ref 3.5–5.3)
POTASSIUM SERPL-SCNC: 3.9 MMOL/L — SIGNIFICANT CHANGE UP (ref 3.5–5.3)
RBC # BLD: 4.11 M/UL — LOW (ref 4.2–5.8)
RBC # FLD: 13.1 % — SIGNIFICANT CHANGE UP (ref 10.3–14.5)
SODIUM SERPL-SCNC: 137 MMOL/L — SIGNIFICANT CHANGE UP (ref 135–145)
WBC # BLD: 5.26 K/UL — SIGNIFICANT CHANGE UP (ref 3.8–10.5)
WBC # FLD AUTO: 5.26 K/UL — SIGNIFICANT CHANGE UP (ref 3.8–10.5)

## 2023-01-05 PROCEDURE — 36415 COLL VENOUS BLD VENIPUNCTURE: CPT

## 2023-01-05 PROCEDURE — 84100 ASSAY OF PHOSPHORUS: CPT

## 2023-01-05 PROCEDURE — 80053 COMPREHEN METABOLIC PANEL: CPT

## 2023-01-05 PROCEDURE — 85027 COMPLETE CBC AUTOMATED: CPT

## 2023-01-05 PROCEDURE — 93306 TTE W/DOPPLER COMPLETE: CPT

## 2023-01-05 PROCEDURE — 80048 BASIC METABOLIC PNL TOTAL CA: CPT

## 2023-01-05 PROCEDURE — 84484 ASSAY OF TROPONIN QUANT: CPT

## 2023-01-05 PROCEDURE — 85025 COMPLETE CBC W/AUTO DIFF WBC: CPT

## 2023-01-05 PROCEDURE — 83735 ASSAY OF MAGNESIUM: CPT

## 2023-01-05 PROCEDURE — 85610 PROTHROMBIN TIME: CPT

## 2023-01-05 PROCEDURE — 84443 ASSAY THYROID STIM HORMONE: CPT

## 2023-01-05 PROCEDURE — 85730 THROMBOPLASTIN TIME PARTIAL: CPT

## 2023-01-05 PROCEDURE — 87635 SARS-COV-2 COVID-19 AMP PRB: CPT

## 2023-01-05 PROCEDURE — 93005 ELECTROCARDIOGRAM TRACING: CPT

## 2023-01-05 PROCEDURE — 99239 HOSP IP/OBS DSCHRG MGMT >30: CPT

## 2023-01-05 PROCEDURE — 99285 EMERGENCY DEPT VISIT HI MDM: CPT | Mod: 25

## 2023-01-05 PROCEDURE — 71045 X-RAY EXAM CHEST 1 VIEW: CPT

## 2023-01-05 RX ORDER — METOPROLOL TARTRATE 50 MG
1 TABLET ORAL
Qty: 60 | Refills: 0
Start: 2023-01-05 | End: 2023-02-03

## 2023-01-05 RX ADMIN — Medication 50 MILLIGRAM(S): at 05:04

## 2023-01-05 RX ADMIN — AMLODIPINE BESYLATE 5 MILLIGRAM(S): 2.5 TABLET ORAL at 05:04

## 2023-01-05 RX ADMIN — HEPARIN SODIUM 1000 UNIT(S)/HR: 5000 INJECTION INTRAVENOUS; SUBCUTANEOUS at 06:43

## 2023-01-05 RX ADMIN — HEPARIN SODIUM 1000 UNIT(S)/HR: 5000 INJECTION INTRAVENOUS; SUBCUTANEOUS at 07:18

## 2023-01-05 NOTE — DISCHARGE NOTE PROVIDER - PROVIDER TOKENS
PROVIDER:[TOKEN:[[id number]]],PROVIDER:[TOKEN:[[id number]]],PROVIDER:[TOKEN:[[id number]]]

## 2023-01-05 NOTE — DISCHARGE NOTE PROVIDER - NSDCCPCAREPLAN_GEN_ALL_CORE_FT
PRINCIPAL DISCHARGE DIAGNOSIS  Diagnosis: Palpitations  Assessment and Plan of Treatment: You were admitted for fast heart rate  You were diagnosed with rapid atrial fibrillation  You were treated with Metoprolol (which is a blood pressure and heart rate medication)  You were prescribed the following new medications: Metroprolol 50 mg twice a day

## 2023-01-05 NOTE — DISCHARGE NOTE PROVIDER - CARE PROVIDERS DIRECT ADDRESSES
,DirectAddress_Unknown,[email or web address],DirectAddress_Unknown

## 2023-01-05 NOTE — DISCHARGE NOTE NURSING/CASE MANAGEMENT/SOCIAL WORK - PATIENT PORTAL LINK FT
You can access the FollowMyHealth Patient Portal offered by [facility] by registering at the following website: [email or web address]. By joining [employer]’s FollowMyHealth portal, you will also be able to view your health information using other applications (apps) compatible with our system.

## 2023-01-05 NOTE — DISCHARGE NOTE NURSING/CASE MANAGEMENT/SOCIAL WORK - NSDCPEXARELTODIET_GEN_ALL_CORE
Eat healthy foods you enjoy. Rivaroxaban/Xarelto DOES NOT have a special diet. Limit your alcohol intake.
yes

## 2023-01-05 NOTE — DISCHARGE NOTE PROVIDER - NSDCFUSCHEDAPPT_GEN_ALL_CORE_FT
Chaim Richardson  [facility]  [facility] [address] [facility]  Scheduled Appointment: 01/17/2023

## 2023-01-05 NOTE — DISCHARGE NOTE PROVIDER - CARE PROVIDER_API CALL
ABAD BRADFORD  Internal Medicine  [address], [address]  [place] [address]  Phone: [phone]  Fax: [phone]  Follow Up Time:     Chaim Richardson)  Surgery  [address] [place] [address]  Phone: [phone]  Fax: [phone]  Follow Up Time:     Rebeca Miranda)  Cardiovascular Disease; Internal Medicine  [address], [address]  [place] [address]  Phone: [phone]  Fax: [phone]  Follow Up Time:

## 2023-01-05 NOTE — DISCHARGE NOTE PROVIDER - NSDCMRMEDTOKEN_GEN_ALL_CORE_FT
amLODIPine 5 mg oral tablet: 1 tab(s) orally once a day  metoprolol tartrate 50 mg oral tablet: 1 tab(s) orally 2 times a day  tamsulosin 0.4 mg oral capsule: 1 cap(s) orally once a day  Xarelto 20 mg oral tablet: 1 tab(s) orally once a day (in the evening)

## 2023-01-05 NOTE — PROGRESS NOTE ADULT - SUBJECTIVE AND OBJECTIVE BOX
Pt. scheduled for wide excision forehead SCC but was tachycardic and hypertensive on admission.  Pt. sent to ED for w/u and was found to be in rapid AFib to 130.  Surgery cancelled.  Admit to medicine  Will try and schedule surgery while in house or will reschedule as outpt.  Case d/w pt. and family at bedside and w Dr. Amaya.
AG MORENO  [id number]      Chief Complaint: Atrial flutter/Frontal scalp squamous cell carcinoma     Interval History: The patient is now reporting a history of atrial fibrillation/flutter, reports that it is intermittent. Denies chest pain, shortness of breath or palpitations.     Tele: sinus rhythm 50s BPM, PACS      Current meds:   acetaminophen     Tablet .. 650 milliGRAM(s) Oral every 6 hours PRN  aluminum hydroxide/magnesium hydroxide/simethicone Suspension 30 milliLiter(s) Oral every 4 hours PRN  amLODIPine   Tablet 5 milliGRAM(s) Oral daily  heparin   Injectable 6500 Unit(s) IV Push every 6 hours PRN  heparin   Injectable 3000 Unit(s) IV Push every 6 hours PRN  heparin  Infusion.  Unit(s)/Hr IV Continuous <Continuous>  melatonin 3 milliGRAM(s) Oral at bedtime PRN  metoprolol tartrate 50 milliGRAM(s) Oral two times a day  ondansetron Injectable 4 milliGRAM(s) IV Push every 8 hours PRN  sodium chloride 0.9%. 1000 milliLiter(s) IV Continuous <Continuous>  tamsulosin 0.4 milliGRAM(s) Oral at bedtime      Objective:     Vital Signs:   T(C): 36.5 (01-04-23 @ 05:17), Max: 36.7 (01-03-23 @ 19:15)  HR: 52 (01-04-23 @ 05:17) (52 - 129)  BP: 118/66 (01-04-23 @ 05:17) (103/58 - 150/77)  RR: 16 (01-04-23 @ 05:17) (16 - 23)  SpO2: 96% (01-04-23 @ 05:17) (96% - 99%)  Wt(kg): --    Physical Exam:   General: no acute distress  HEENT: sclera anicteric  Neck: supple  CVS: JVP ~ 7 cm H20, RRR, s1, s2  Chest: unlabored respirations, clear to auscultation b/l  Abdomen: non-distended  Extremities: no lower extremity edema b/l  Neuro: awake, alert & oriented x 3  Psych: normal affect      Labs:   04 Jan 2023 06:45    136    |  104    |  27     ----------------------------<  95     4.4     |  27     |  1.12     Ca    8.4        04 Jan 2023 06:45  Phos  2.7       04 Jan 2023 06:45  Mg     2.2       04 Jan 2023 06:45    TPro  7.0    /  Alb  3.6    /  TBili  0.6    /  DBili  x      /  AST  16     /  ALT  16     /  AlkPhos  67     03 Jan 2023 10:25                          12.0   5.95  )-----------( 203      ( 04 Jan 2023 06:45 )             37.7     PT/INR - ( 03 Jan 2023 18:25 )   PT: 13.3 sec;   INR: 1.14 ratio         PTT - ( 04 Jan 2023 01:04 )  PTT:170.3 sec        Initial ECG: poor baseline, possible sinus rhythm   Repeat ECG (1/3/23): atrial flutter with rapid ventricular response    No prior cardiac workup in chart  
Patient is a 89y old  Male who presents with a chief complaint of Rapid HR (04 Jan 2023 09:44)    Patient seen and examined at bedside. No overnight events reported. No chest pain. Now in NSR    ALLERGIES:  No Known Allergies    MEDICATIONS  (STANDING):  amLODIPine   Tablet 5 milliGRAM(s) Oral daily  heparin  Infusion.  Unit(s)/Hr (15 mL/Hr) IV Continuous <Continuous>  metoprolol tartrate 50 milliGRAM(s) Oral two times a day  sodium chloride 0.9%. 1000 milliLiter(s) (100 mL/Hr) IV Continuous <Continuous>  tamsulosin 0.4 milliGRAM(s) Oral at bedtime    MEDICATIONS  (PRN):  acetaminophen     Tablet .. 650 milliGRAM(s) Oral every 6 hours PRN Temp greater or equal to 38C (100.4F), Mild Pain (1 - 3)  aluminum hydroxide/magnesium hydroxide/simethicone Suspension 30 milliLiter(s) Oral every 4 hours PRN Dyspepsia  heparin   Injectable 6500 Unit(s) IV Push every 6 hours PRN For aPTT less than 40  heparin   Injectable 3000 Unit(s) IV Push every 6 hours PRN For aPTT between 40 - 57  melatonin 3 milliGRAM(s) Oral at bedtime PRN Insomnia  ondansetron Injectable 4 milliGRAM(s) IV Push every 8 hours PRN Nausea and/or Vomiting    Vital Signs Last 24 Hrs  T(F): 97.7 (04 Jan 2023 13:17), Max: 98.2 (04 Jan 2023 12:00)  HR: 56 (04 Jan 2023 13:17) (52 - 126)  BP: 115/70 (04 Jan 2023 13:17) (103/58 - 134/86)  RR: 18 (04 Jan 2023 13:17) (16 - 19)  SpO2: 96% (04 Jan 2023 13:17) (94% - 99%)  I&O's Summary    03 Jan 2023 07:01  -  04 Jan 2023 07:00  --------------------------------------------------------  IN: 0 mL / OUT: 400 mL / NET: -400 mL    04 Jan 2023 07:01  -  04 Jan 2023 15:23  --------------------------------------------------------  IN: 700 mL / OUT: 900 mL / NET: -200 mL      PHYSICAL EXAM:  General: NAD, A/O x 3  ENT: No gross hearing impairment, Moist mucous membranes, no thrush  Neck: Supple, No JVD  Lungs: Clear to auscultation bilaterally, good air entry, non-labored breathing  Cardio: RRR, S1/S2  Abdomen: Soft, Nontender, Nondistended; Bowel sounds present  Extremities: No calf tenderness, No cyanosis  Psych: Appropriate mood and affect  Skin: Forehead lesion covered with bandages    LABS:                        12.0   5.95  )-----------( 203      ( 04 Jan 2023 06:45 )             37.7     01-04    136  |  104  |  27  ----------------------------<  95  4.4   |  27  |  1.12    Ca    8.4      04 Jan 2023 06:45  Phos  2.7     01-04  Mg     2.2     01-04    TPro  7.0  /  Alb  3.6  /  TBili  0.6  /  DBili  x   /  AST  16  /  ALT  16  /  AlkPhos  67  01-03          PT/INR - ( 03 Jan 2023 18:25 )   PT: 13.3 sec;   INR: 1.14 ratio         PTT - ( 04 Jan 2023 09:47 )  PTT:97.0 sec      CARDIAC MARKERS ( 03 Jan 2023 12:50 )  x     / 15.7 ng/L / x     / x     / x      CARDIAC MARKERS ( 03 Jan 2023 11:20 )  x     / 14.7 ng/L / x     / x     / x      CARDIAC MARKERS ( 03 Jan 2023 10:25 )  x     / 15.1 ng/L / x     / x     / x            TSH 0.692   TSH with FT4 reflex --  Total T3 --      COVID-19 PCR: NotDetec (01-03-23 @ 15:05)    < from: 12 Lead ECG (01.03.23 @ 10:30) >  Diagnosis Line Atrial flutter/fibrillation  with variable AV block with premature ventricular or aberrantly conducted complexes  Left axis deviation  Nonspecific ST abnormality  Abnormal ECG  When compared with ECG of 03-JAN-2023 09:26,  No significant change was found  Confirmed by Darryl Miranda ([id number]) on 1/4/2023 7:21:12 AM    < end of copied text >    < from: TTE Echo Complete w/o Contrast w/ Doppler (01.04.23 @ 07:56) >   1. Normal global left ventricular systolic function.   2. Left ventricular ejection fraction, by visual estimation, is 60 to   65%.   3. Normal left ventricular internal cavity size.   4. Mild upper septal wall hypertrophy (1.2 cm).   5. Moderately enlarged right ventricle.   6. The left atrium is normal in size.   7. Mildly enlarged right atrium.   8. Mild mitral annular calcification.   9. Mild thickening and calcification of the anterior and posterior   mitral valve leaflets.  10. Mild mitral valve regurgitation.  11. Moderate tricuspid regurgitation.  12. Mild aortic valve leaflet calcification. No aortic valve stenosis.  13. Mild pulmonic valve regurgitation.  14. There is mild aortic root calcification.  15. There is at least mild pulmonary hypertension, estimated RVSP    38 mmHg.  16. There is no evidence of pericardial effusion.    < end of copied text >      TELE: NSR 60s
Patient is a 89y old  Male who presents with a chief complaint of Rapid HR (04 Jan 2023 15:21)      Patient seen and examined at bedside. No overnight events reported.     ALLERGIES:  No Known Allergies    MEDICATIONS  (STANDING):  amLODIPine   Tablet 5 milliGRAM(s) Oral daily  metoprolol tartrate 50 milliGRAM(s) Oral two times a day  tamsulosin 0.4 milliGRAM(s) Oral at bedtime    MEDICATIONS  (PRN):  acetaminophen     Tablet .. 650 milliGRAM(s) Oral every 6 hours PRN Temp greater or equal to 38C (100.4F), Mild Pain (1 - 3)  aluminum hydroxide/magnesium hydroxide/simethicone Suspension 30 milliLiter(s) Oral every 4 hours PRN Dyspepsia  melatonin 3 milliGRAM(s) Oral at bedtime PRN Insomnia  ondansetron Injectable 4 milliGRAM(s) IV Push every 8 hours PRN Nausea and/or Vomiting    Vital Signs Last 24 Hrs  T(F): 97.3 (05 Jan 2023 05:11), Max: 98.7 (04 Jan 2023 19:49)  HR: 68 (05 Jan 2023 05:11) (56 - 92)  BP: 146/66 (05 Jan 2023 05:11) (106/65 - 146/66)  RR: 17 (05 Jan 2023 05:11) (16 - 18)  SpO2: 95% (05 Jan 2023 05:11) (94% - 96%)  I&O's Summary    04 Jan 2023 07:01  -  05 Jan 2023 07:00  --------------------------------------------------------  IN: 810 mL / OUT: 1600 mL / NET: -790 mL      PHYSICAL EXAM:  General: NAD, A/O x 3  ENT: [place], Moist mucous membranes, no thrush  Neck: Supple, No JVD  Lungs: Clear to auscultation bilaterally, good air entry, non-labored breathing  Cardio: RRR, S1/S2  Abdomen: Soft, Nontender, Nondistended; Bowel sounds present  Extremities: No calf tenderness, No cyanosis  Psych: Appropriate mood and affect  Skin: Forehead lesion covered with bandages        LABS:                        11.6   5.26  )-----------( 166      ( 05 Jan 2023 06:15 )             36.2     01-05    137  |  105  |  28  ----------------------------<  106  3.9   |  24  |  1.28    Ca    8.5      05 Jan 2023 06:15  Phos  2.7     01-04  Mg     2.2     01-04    TPro  7.0  /  Alb  3.6  /  TBili  0.6  /  DBili  x   /  AST  16  /  ALT  16  /  AlkPhos  67  01-03          PT/INR - ( 03 Jan 2023 18:25 )   PT: 13.3 sec;   INR: 1.14 ratio         PTT - ( 05 Jan 2023 06:15 )  PTT:62.8 sec      CARDIAC MARKERS ( 03 Jan 2023 12:50 )  x     / 15.7 ng/L / x     / x     / x      CARDIAC MARKERS ( 03 Jan 2023 11:20 )  x     / 14.7 ng/L / x     / x     / x      CARDIAC MARKERS ( 03 Jan 2023 10:25 )  x     / 15.1 ng/L / x     / x     / x            TSH 0.692   TSH with FT4 reflex --  Total T3 --                      COVID-19 PCR: NotDetec (01-03-23 @ 15:05)    RADIOLOGY & ADDITIONAL TESTS:    Care Discussed with Consultants/Other Providers:

## 2023-01-05 NOTE — DISCHARGE NOTE NURSING/CASE MANAGEMENT/SOCIAL WORK - NSDCPEFALRISK_GEN_ALL_CORE
For information on Fall & Injury Prevention, visit: [email or web address] OR  [email or web address] OR  https://www.cdc.gov/steadi/patient.html

## 2023-01-05 NOTE — DISCHARGE NOTE PROVIDER - HOSPITAL COURSE
Hospital Course  HPI:  89M with HTN, BPH, hx PE on Xarelto, comes to the ED for rapid heart rate while in pre-op awaiting for elective removal of SCC from forehead with plan for plastic reconstruction. Patient is ASYMPTOMATIC. No chest pain. No shortness of breath. No palpitations. No SHEFFIELD. No hx of A-fib. Unaware that patient is currently have a rapid HR. Does have chronic HTN (stable, controlled with meds), BPH (stable, controlled with med, voids freely), and known history of PE (stable, no SOB, on Xarelto). Compliant with all meds. Patient does have forehead lesion (SCC) and other facial skin lesions. Last ate/drank last night.  (03 Jan 2023 17:48)    Patient treated for rapid AF with beta blocker. AF eventually converted back to NSR. Patient was on heparin gtt due to hx of PE and AF, while awaiting surgery. However, surgery was cancelled (due to scheduling conflicts from the surgeons) and will be rescheduled in the future. Will be on beta blocker and Xarelto. The Xarelto will need to be held prior to surgery as directed from surgery,         You will need to follow up with your primary care physician.    FULL CODE    Discharging Provider:  Chandler Amaya MD  Contact Info: Cell [phone] - Please call with any questions or concerns.    Outpatient Provider: Dr. Angelito Dunn - notified

## 2023-01-05 NOTE — PROGRESS NOTE ADULT - ASSESSMENT
89M with HTN, BPH, hx PE on Xarelto, comes to the ED for rapid heart rate while in pre-op awaiting for elective removal of SCC of skin with plastic reconstruction, found to have new AF with RVR    #AF with RVR  -Now converted to NSR and improved HR  -c/w lopressor 50 bid (spoke to cardio, okay with this dose at this time)  -Patient's surgery cancelled until the following week.... stop heparin gtt, resume Xarelto. Will need to stop Xarelto as directed by surgery prior to surgery in the future     #BPH  -c/w Flomax    #Essential HTN  -c/w Norvasc  -off home Valsartan while on Lopressor     #DVT ppx: Heparin drip     GOC: FULL CODE    Stable for d/c. Time spend on d/c 33 min

## 2023-01-13 RX ORDER — SODIUM CHLORIDE 9 MG/ML
1000 INJECTION, SOLUTION INTRAVENOUS
Refills: 0 | Status: DISCONTINUED | OUTPATIENT
Start: 2023-01-17 | End: 2023-01-17

## 2023-01-16 ENCOUNTER — TRANSCRIPTION ENCOUNTER (OUTPATIENT)
Age: 88
End: 2023-01-16

## 2023-01-17 ENCOUNTER — RESULT REVIEW (OUTPATIENT)
Age: 88
End: 2023-01-17

## 2023-01-17 ENCOUNTER — TRANSCRIPTION ENCOUNTER (OUTPATIENT)
Age: 88
End: 2023-01-17

## 2023-01-17 ENCOUNTER — APPOINTMENT (OUTPATIENT)
Dept: SURGICAL ONCOLOGY | Facility: HOSPITAL | Age: 88
End: 2023-01-17

## 2023-01-17 ENCOUNTER — OUTPATIENT (OUTPATIENT)
Dept: INPATIENT UNIT | Facility: HOSPITAL | Age: 88
LOS: 1 days | End: 2023-01-17
Payer: MEDICARE

## 2023-01-17 VITALS
DIASTOLIC BLOOD PRESSURE: 76 MMHG | TEMPERATURE: 98 F | HEART RATE: 52 BPM | SYSTOLIC BLOOD PRESSURE: 150 MMHG | WEIGHT: 169.32 LBS | OXYGEN SATURATION: 97 % | RESPIRATION RATE: 15 BRPM | HEIGHT: 67.75 IN

## 2023-01-17 VITALS
RESPIRATION RATE: 18 BRPM | HEART RATE: 67 BPM | DIASTOLIC BLOOD PRESSURE: 70 MMHG | OXYGEN SATURATION: 98 % | SYSTOLIC BLOOD PRESSURE: 141 MMHG

## 2023-01-17 DIAGNOSIS — Z90.49 ACQUIRED ABSENCE OF OTHER SPECIFIED PARTS OF DIGESTIVE TRACT: Chronic | ICD-10-CM

## 2023-01-17 DIAGNOSIS — R22.0 LOCALIZED SWELLING, MASS AND LUMP, HEAD: ICD-10-CM

## 2023-01-17 DIAGNOSIS — C44.329 SQUAMOUS CELL CARCINOMA OF SKIN OF OTHER PARTS OF FACE: ICD-10-CM

## 2023-01-17 PROCEDURE — 11107 INCAL BX SKN EA SEP/ADDL: CPT

## 2023-01-17 PROCEDURE — 21014 EXC FACE TUM DEEP 2 CM/>: CPT

## 2023-01-17 PROCEDURE — 21011 EXC FACE LES SC <2 CM: CPT | Mod: 59

## 2023-01-17 PROCEDURE — 88305 TISSUE EXAM BY PATHOLOGIST: CPT | Mod: 26

## 2023-01-17 PROCEDURE — 15120 SPLT AGRFT F/S/N/H/F/G/M 1ST: CPT | Mod: XP

## 2023-01-17 PROCEDURE — 21016 RESECT FACE/SCALP TUM 2 CM/>: CPT

## 2023-01-17 PROCEDURE — 11106 INCAL BX SKN SINGLE LES: CPT

## 2023-01-17 PROCEDURE — 88305 TISSUE EXAM BY PATHOLOGIST: CPT

## 2023-01-17 PROCEDURE — 88331 PATH CONSLTJ SURG 1 BLK 1SPC: CPT | Mod: 26

## 2023-01-17 PROCEDURE — 88331 PATH CONSLTJ SURG 1 BLK 1SPC: CPT

## 2023-01-17 RX ORDER — ONDANSETRON 8 MG/1
4 TABLET, FILM COATED ORAL ONCE
Refills: 0 | Status: DISCONTINUED | OUTPATIENT
Start: 2023-01-17 | End: 2023-01-17

## 2023-01-17 RX ORDER — OXYCODONE AND ACETAMINOPHEN 5; 325 MG/1; MG/1
2 TABLET ORAL EVERY 6 HOURS
Refills: 0 | Status: DISCONTINUED | OUTPATIENT
Start: 2023-01-17 | End: 2023-01-17

## 2023-01-17 RX ORDER — OXYCODONE AND ACETAMINOPHEN 5; 325 MG/1; MG/1
1 TABLET ORAL EVERY 4 HOURS
Refills: 0 | Status: DISCONTINUED | OUTPATIENT
Start: 2023-01-17 | End: 2023-01-17

## 2023-01-17 RX ORDER — HYDROMORPHONE HYDROCHLORIDE 2 MG/ML
1 INJECTION INTRAMUSCULAR; INTRAVENOUS; SUBCUTANEOUS
Refills: 0 | Status: DISCONTINUED | OUTPATIENT
Start: 2023-01-17 | End: 2023-01-17

## 2023-01-17 RX ORDER — TAMSULOSIN HYDROCHLORIDE 0.4 MG/1
1 CAPSULE ORAL
Qty: 0 | Refills: 0 | DISCHARGE

## 2023-01-17 RX ORDER — RIVAROXABAN 15 MG-20MG
1 KIT ORAL
Qty: 0 | Refills: 0 | DISCHARGE

## 2023-01-17 RX ORDER — AMLODIPINE BESYLATE 2.5 MG/1
1 TABLET ORAL
Qty: 0 | Refills: 0 | DISCHARGE

## 2023-01-17 RX ORDER — SODIUM CHLORIDE 9 MG/ML
1000 INJECTION, SOLUTION INTRAVENOUS
Refills: 0 | Status: DISCONTINUED | OUTPATIENT
Start: 2023-01-17 | End: 2023-01-17

## 2023-01-17 RX ORDER — HYDROMORPHONE HYDROCHLORIDE 2 MG/ML
0.5 INJECTION INTRAMUSCULAR; INTRAVENOUS; SUBCUTANEOUS
Refills: 0 | Status: DISCONTINUED | OUTPATIENT
Start: 2023-01-17 | End: 2023-01-17

## 2023-01-17 RX ADMIN — SODIUM CHLORIDE 50 MILLILITER(S): 9 INJECTION, SOLUTION INTRAVENOUS at 12:02

## 2023-01-17 NOTE — ASU DISCHARGE PLAN (ADULT/PEDIATRIC) - ASU DC SPECIAL INSTRUCTIONSFT
this patient may be discharged home from asu when criteri is met   follow up with Dr Richardson in 2 weeks   follow up with Dr Salazar in 7-10 days   DO NOT GET SCALP DRESSING WET , may shower over left leg dressing and wash face in 48 hours , do not remove steri strips   DO NOT GET SCALP DRESSING WET   take OTC tylenol and motrin for releif of discomfort this patient may be discharged home from asu when criteri is met   follow up with Dr Richardson in 2 weeks   follow up with Dr Salazar in 7-10 days   DO NOT GET SCALP DRESSING WET , may shower over left leg dressing and wash face in 48 hours , do not remove steri strips   DO NOT GET SCALP DRESSING WET   take OTC tylenol and motrin for releif of discomfort  restart anticoagulation in 2 days   ( 48 hours) 76

## 2023-01-17 NOTE — ASU DISCHARGE PLAN (ADULT/PEDIATRIC) - CARE PROVIDER_API CALL
Alex Salazar)  Plastic Surgery; Surgery  [address], [address]  [place] [address]  Phone: [phone]  Fax: [phone]  Follow Up Time:     Chaim Richardson)  Surgery  [address] [place] [address]  Phone: [phone]  Fax: [phone]  Follow Up Time:

## 2023-01-17 NOTE — ASU PATIENT PROFILE, ADULT - FALL HARM RISK - HARM RISK INTERVENTIONS

## 2023-01-21 ENCOUNTER — NON-APPOINTMENT (OUTPATIENT)
Age: 88
End: 2023-01-21

## 2023-01-25 ENCOUNTER — TRANSCRIPTION ENCOUNTER (OUTPATIENT)
Age: 88
End: 2023-01-25

## 2023-01-27 LAB — SURGICAL PATHOLOGY STUDY: SIGNIFICANT CHANGE UP

## 2023-01-30 ENCOUNTER — NON-APPOINTMENT (OUTPATIENT)
Age: 88
End: 2023-01-30

## 2023-02-10 ENCOUNTER — NON-APPOINTMENT (OUTPATIENT)
Age: 88
End: 2023-02-10

## 2023-02-13 ENCOUNTER — APPOINTMENT (OUTPATIENT)
Dept: SURGICAL ONCOLOGY | Facility: CLINIC | Age: 88
End: 2023-02-13
Payer: MEDICARE

## 2023-02-13 VITALS
BODY MASS INDEX: 25.33 KG/M2 | RESPIRATION RATE: 16 BRPM | DIASTOLIC BLOOD PRESSURE: 79 MMHG | TEMPERATURE: 97.8 F | SYSTOLIC BLOOD PRESSURE: 172 MMHG | WEIGHT: 171 LBS | OXYGEN SATURATION: 97 % | HEART RATE: 50 BPM | HEIGHT: 69 IN

## 2023-02-13 DIAGNOSIS — C44.329 SQUAMOUS CELL CARCINOMA OF SKIN OF OTHER PARTS OF FACE: ICD-10-CM

## 2023-02-13 PROCEDURE — 99024 POSTOP FOLLOW-UP VISIT: CPT

## 2023-02-13 NOTE — CONSULT LETTER
[Dear  ___] : Dear  [unfilled], [Consult Letter:] : I had the pleasure of evaluating your patient, [unfilled]. [Please see my note below.] : Please see my note below. [Sincerely,] : Sincerely, [DrParviz ___] : Dr. HORTON [FreeTextEntry3] : Chaim Richardson MD FACS\par  [DrParviz  ___] : Dr. HORTON

## 2023-02-13 NOTE — HISTORY OF PRESENT ILLNESS
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 \par \par Pathology 1/17/23:\par 1. Skin, scalp, wide excision: invasive cell carcinoma, poorly differentiated, 3.5 cm in greatest dimension. No lymphovascular or perineural invasion identified. Resection margins negative for carcinoma.\par 2. Left cheek shave biopsy: squamous cell carcinoma, at least in situ, incompletely excised\par 3. Right cheek shave biopsy: squamous cell carcinoma, at least in situ, transected at the base, crusted and impetiginized. Atypical keratinocytes extend to the base of the specimen where the possibility of a component of squamous cell carcinoma invading into the underlying dermis cannot be excluded. \par  \par Dermatopathology (8/03/22):\par Mid Forehead\par -Invasive poorly differentiated squamous cell carcinoma, extending to the base and lateral tissue edges\par \par PMHx: HTN, BPH, He is currently on Xarelto for PE back in 2020, Prior basal cell carcinomas resected

## 2023-02-13 NOTE — ASSESSMENT
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 observation \par Will proceed with close observation as there is no remaining clinical evidence of disease\par Case discussed with Dr. Merrill of dermatology who will follow up with the patient as well\par All questions answered\par RTO 3 months\par \par \par enclosed pathology report.

## 2023-02-13 NOTE — PHYSICAL EXAM
[de-identified] : Scalp skin graft healing well. Bilateral cheek shave biopsy sites completely healed without any residual lesions noted.

## 2023-09-26 NOTE — ASU PATIENT PROFILE, ADULT - TEACHING/LEARNING LEARNING PREFERENCES
After obtaining consent, gave patient 45mg 6 month lupron injection in RIGHT upper quad. gluteus, patient tolerated well. Medication was not supplied by the patient. Patient due for next lupron on 3/21/24 and to keep scheduled follow up with Dr. Jama Durbin on 11/17/23 at 8:30am for a 6 month follow yp with PSA prior.
individual instruction/verbal instruction

## 2024-01-22 NOTE — DISCUSSION/SUMMARY
Advance Care Planning   Ambulatory ACP Specialist Patient Outreach    Date:  1/22/2024    ACP Specialist:  Eunice Shearer    Outreach call to patient in follow-up to ACP Specialist referral from: Candice Real, APRN - CNP    [x] PCP  [] Provider   [] Ambulatory Care Management [] Other     For:                  [x] Advance Directive Assistance              [] Complete Portable DNR order              [] Complete POST/POLST/MOST              [] Code Status Discussion             [] Discuss Goals of Care             [] Early ACP Decision-Making              [] Other (Specify)    Date Referral Received: 1/19/2024    Next Step:   [x] ACP scheduled conversation  [] Outreach again in one week               [] Email / Mail ACP Info Sheets  [] Email / Mail Advance Directive   [] Closing referral.  Routing closure to referring provider/staff and to ACP Specialist .    [] Closure letter mailed to patient with invitation to contact ACP Specialist if / when ready.   [] Other (Specify here):         [x] At this time, Healthcare Decision Maker Is:    Advance Care Planning   Healthcare Decision Maker:    Primary Decision Maker: Vernell Melendez - Spouse - [phone]      [] Primary agent named in scanned advance directive.    [x] Legal Next of Kin.     [] Unable to determine legal decision maker at this time.       Outreaches:       [x] 1st -  Date:  1/22/2024               Intervention:  [x] Spoke with Patient   [] Left Voice mail [] Email / Mail    [] [employer] Post[employer]  [] Other (Specify) :     Outcomes:  Writer attempted ACP outreach to the one number listed for both, patient's home and mobile ([phone]) - spoke to patient.  Patient is agreeable to a conversation with ACP Specialist Precious Adame on Thursday, 1/25/2024, at 1:00 PM.  Patient reports receiving copies of ACP documents from provider's office and declined receiving duplicate documents from writer.            Thank you for this referral.      [de-identified] : We reviewed his imaging.  We discussed further treatment options both nonsurgical and surgical.  At this point he wished to continue with conservative measures.  He will be evaluated by pain management for possible injection based therapies.  Advanced imaging if not improved or worsen.

## 2024-03-30 ENCOUNTER — NON-APPOINTMENT (OUTPATIENT)
Age: 89
End: 2024-03-30

## 2025-01-02 NOTE — ED ADULT NURSE NOTE - NS ED NURSE RECORD ANOTHER VITAL SIGN
Yes

## (undated) DEVICE — PIN DISPENSING SPIKE MINI

## (undated) DEVICE — ZIMMER BLADE DERMATONE

## (undated) DEVICE — DRSG XEROFORM 5 X 9"

## (undated) DEVICE — LABELS BLANK W PEN

## (undated) DEVICE — SPECIMEN CONTAINER 100ML

## (undated) DEVICE — POSITIONER STRAP ARMBOARD VELCRO TS-30

## (undated) DEVICE — SYR LUER LOK 20CC

## (undated) DEVICE — VENODYNE/SCD SLEEVE CALF MEDIUM

## (undated) DEVICE — SUT MONOCRYL 3-0 27" PS-2 UNDYED

## (undated) DEVICE — PREP BETADINE KIT

## (undated) DEVICE — SUT VICRYL 3-0 27" SH UNDYED

## (undated) DEVICE — DRSG STERISTRIPS 0.5 X 4"

## (undated) DEVICE — SUT MONOCRYL 3-0 18" PS-1

## (undated) DEVICE — GLV 7 PROTEXIS (WHITE)

## (undated) DEVICE — POSITIONER PATIENT SAFETY STRAP 3X60"

## (undated) DEVICE — GLV 7.5 PROTEXIS (WHITE)

## (undated) DEVICE — PACK MINOR WITH LAP

## (undated) DEVICE — SOL IRR POUR NS 0.9% 500ML

## (undated) DEVICE — SUT VICRYL 2-0 27" SH UNDYED

## (undated) DEVICE — DRAPE LAPAROTOMY TRANSVERSE

## (undated) DEVICE — ELCTR GROUNDING PAD ADULT COVIDIEN

## (undated) DEVICE — SUT MONOCRYL 4-0 18" P-3 UNDYED

## (undated) DEVICE — DRSG TELFA 3 X 8